# Patient Record
Sex: FEMALE | Race: BLACK OR AFRICAN AMERICAN | NOT HISPANIC OR LATINO | Employment: UNEMPLOYED | URBAN - METROPOLITAN AREA
[De-identification: names, ages, dates, MRNs, and addresses within clinical notes are randomized per-mention and may not be internally consistent; named-entity substitution may affect disease eponyms.]

---

## 2021-05-04 ENCOUNTER — TELEPHONE (OUTPATIENT)
Dept: OBSTETRICS AND GYNECOLOGY | Facility: CLINIC | Age: 36
End: 2021-05-04

## 2021-05-26 ENCOUNTER — OFFICE VISIT (OUTPATIENT)
Dept: OBSTETRICS AND GYNECOLOGY | Facility: CLINIC | Age: 36
End: 2021-05-26

## 2021-05-26 VITALS
DIASTOLIC BLOOD PRESSURE: 70 MMHG | WEIGHT: 177.5 LBS | SYSTOLIC BLOOD PRESSURE: 130 MMHG | HEIGHT: 58 IN | BODY MASS INDEX: 37.26 KG/M2

## 2021-05-26 DIAGNOSIS — R10.2 PELVIC PAIN: Primary | ICD-10-CM

## 2021-05-26 PROCEDURE — 99203 OFFICE O/P NEW LOW 30 MIN: CPT | Performed by: OBSTETRICS & GYNECOLOGY

## 2021-05-26 RX ORDER — PROMETHAZINE HYDROCHLORIDE 25 MG/1
TABLET ORAL
COMMUNITY
Start: 2021-05-19

## 2021-05-26 RX ORDER — TOPIRAMATE 50 MG/1
50 TABLET, FILM COATED ORAL 2 TIMES DAILY
COMMUNITY
Start: 2021-04-23

## 2021-05-26 RX ORDER — TRAZODONE HYDROCHLORIDE 50 MG/1
TABLET ORAL
COMMUNITY
Start: 2021-05-21

## 2021-05-26 RX ORDER — FLUTICASONE PROPIONATE 50 MCG
SPRAY, SUSPENSION (ML) NASAL
COMMUNITY
Start: 2021-02-28

## 2021-05-26 RX ORDER — CLOTRIMAZOLE 1 %
CREAM (GRAM) TOPICAL
COMMUNITY
Start: 2021-04-23

## 2021-05-26 RX ORDER — LURASIDONE HYDROCHLORIDE 120 MG/1
TABLET, FILM COATED ORAL
COMMUNITY
Start: 2021-05-21

## 2021-05-26 RX ORDER — HYDROXYZINE PAMOATE 25 MG/1
25 CAPSULE ORAL
COMMUNITY
Start: 2021-01-07

## 2021-05-26 RX ORDER — BACLOFEN 20 MG/1
20 TABLET ORAL 3 TIMES DAILY
COMMUNITY
Start: 2021-04-23

## 2021-05-26 RX ORDER — FESOTERODINE FUMARATE 8 MG/1
TABLET, FILM COATED, EXTENDED RELEASE ORAL
COMMUNITY
Start: 2021-05-24

## 2021-05-26 RX ORDER — ALBUTEROL SULFATE 90 UG/1
2 AEROSOL, METERED RESPIRATORY (INHALATION)
COMMUNITY
Start: 2021-01-07

## 2021-05-26 RX ORDER — DULOXETIN HYDROCHLORIDE 60 MG/1
60 CAPSULE, DELAYED RELEASE ORAL DAILY
COMMUNITY
Start: 2021-04-23

## 2021-05-26 RX ORDER — CETIRIZINE HYDROCHLORIDE 10 MG/1
TABLET ORAL
COMMUNITY
Start: 2021-05-22

## 2021-05-26 RX ORDER — AMLODIPINE BESYLATE 10 MG/1
TABLET ORAL
COMMUNITY
Start: 2021-05-21

## 2021-05-26 NOTE — PROGRESS NOTES
"        REASON FOR FOLLOWUP/CHIEF COMPLAINT: Pelvic pressure/pain and pregnancy symptoms.     HISTORY OF PRESENT ILLNESS: Patient is seen for evaluation of symptoms that she reports that are similar to when she was pregnant.  She states that she had a last normal menses in February 2021.  She was seen at [facility] specifically to have an ultrasound to confirm a pregnancy that she was convinced was present despite having had negative pregnancy test at her family doctor 2 days prior.  This was on or about 4/20/2021.  Serum pregnancy test were negative on that evaluation and a bedside ultrasound apparently was done on review of the notes that showed no evidence of an intrauterine pregnancy.  The patient saw another provider on or about 4/24 with a similar complaints.  She is been adequately explained by all of these providers that there was no evidence of any pregnancy and with all of the pregnancy test being negative we could not concur that she had had a miscarriage at any point.  She states she still has nausea and pelvic pressure and wishes further evaluation.  Patient also requests STD screening.      Past Medical History, Past Surgical History, Social History, Family History have been reviewed and are without significant changes except as mentioned.    Review of Systems   Review of Systems   Constitutional: Negative for fever.   Genitourinary: Negative for vaginal bleeding.        Pelvic pressure       Medications:  The current medication list was reviewed in the EMR    ALLERGIES:    Allergies   Allergen Reactions   • Penicillins Nausea And Vomiting   • Salicylic Acid GI Intolerance     Pt states she is allergic because it shuts her body down.    • Grapefruit GI Intolerance              Vitals:    05/26/21 1429   BP: 130/70   Weight: 80.5 kg (177 lb 8 oz)   Height: 147.3 cm (58\")     Physical Exam    CONSTITUTIONAL:  Vital signs reviewed.  No distress, looks comfortable.  EYES:  Conjunctiva and " lids unremarkable.  PERRLA  EARS,NOSE,MOUTH,THROAT:  Ears and nose appear unremarkable.  Lips, teeth, gums appear unremarkable.  RESPIRATORY:  Normal respiratory effort.   GASTROINTESTINAL: Abdomen appears unremarkable.  Nontender.  No hepatomegaly.  No splenomegaly.  Well-healed Pfannenstiel incision   PELVIC: Normal external genitalia and cervix no evidence of bleeding or abnormal discharge.  NEURO: cranial nerves 2-12 grossly intact.  No focal deficits.  Appears to have equal strength all 4 extremities.  MUSCULOSKELETAL:  Unremarkable digits/nails.  No cyanosis or clubbing.  SKIN:  Warm.  No rashes.  PSYCHIATRIC:  Normal judgment and insight.  Appears guarded and flat       RECENT LABS:  WBC   Date Value Ref Range Status   10/20/2020 6.21 4.5 - 11.0 10*3/uL Final     Hemoglobin   Date Value Ref Range Status   10/20/2020 15.5 12.0 - 16.0 g/dL Final     Platelets   Date Value Ref Range Status   10/20/2020 346 140 - 440 10*3/uL Final       ASSESSMENT/PLAN:  Kimberly Larios Room/bed info not found   Pelvic pressure with multiple negative pregnancy tests and amenorrhea.  We worked her in for a transvaginal ultrasound to look at the uterus and adnexa.  STD screen performed.  Option of continued expectant management for her next menses versus Provera withdrawal were discussed and offered with the patient.  She elects to wait for spontaneous resumption of her periods.  She wishes to make an appointment for 4 to 6 weeks for an annual visit with Pap smear.    Pelvic ultrasound is done and reviewed with the patient.  It shows a normal-appearing uterus with no free fluid and no adnexal masses.  No cysts are seen.  The endometrium appears a bit thicker.

## 2021-05-29 LAB
A VAGINAE DNA VAG QL NAA+PROBE: NORMAL SCORE
BVAB2 DNA VAG QL NAA+PROBE: NORMAL SCORE
C ALBICANS DNA VAG QL NAA+PROBE: NEGATIVE
C GLABRATA DNA VAG QL NAA+PROBE: NEGATIVE
C TRACH DNA VAG QL NAA+PROBE: NEGATIVE
MEGA1 DNA VAG QL NAA+PROBE: NORMAL SCORE
N GONORRHOEA DNA VAG QL NAA+PROBE: NEGATIVE
T VAGINALIS DNA VAG QL NAA+PROBE: NEGATIVE

## 2021-06-24 ENCOUNTER — OFFICE VISIT (OUTPATIENT)
Dept: OBSTETRICS AND GYNECOLOGY | Facility: CLINIC | Age: 36
End: 2021-06-24

## 2021-06-24 VITALS
BODY MASS INDEX: 37.36 KG/M2 | DIASTOLIC BLOOD PRESSURE: 70 MMHG | HEIGHT: 58 IN | WEIGHT: 178 LBS | SYSTOLIC BLOOD PRESSURE: 128 MMHG

## 2021-06-24 DIAGNOSIS — Z01.419 ENCOUNTER FOR ANNUAL ROUTINE GYNECOLOGICAL EXAMINATION: Primary | ICD-10-CM

## 2021-06-24 PROCEDURE — 99395 PREV VISIT EST AGE 18-39: CPT | Performed by: OBSTETRICS & GYNECOLOGY

## 2021-06-24 NOTE — PROGRESS NOTES
GYN Annual Exam     CC- Here for annual exam.     Kimberly Larios is a 35 y.o. female who presents for annual well woman exam. Periods are Normal.  She just had her first period since and presumed miscarriage about a month to 6 weeks ago.  Lasting 4 days. Dysmenorrhea:none. Cyclic symptoms include none. No intermenstrual bleeding, spotting, or discharge.    OB History        3    Para   2    Term   2            AB   1    Living   2       SAB   1    TAB        Ectopic        Molar        Multiple        Live Births   2                Current contraception: none  History of abnormal Pap smear: no  Family history of uterine, colon or ovarian cancer: no  History of abnormal mammogram: no  Family history of breast cancer: no  Last Pap : More than 2 years ago.  None documented in the epic system.    Past Medical History:   Diagnosis Date   • Anxiety    • Asthma    • Back spasm    • Depression    • HTN (hypertension)    • Migraines        Past Surgical History:   Procedure Laterality Date   •  SECTION           Current Outpatient Medications:   •  albuterol sulfate HFA (ProAir HFA) 108 (90 Base) MCG/ACT inhaler, Inhale 2 puffs., Disp: , Rfl:   •  amLODIPine (NORVASC) 10 MG tablet, , Disp: , Rfl:   •  baclofen (LIORESAL) 20 MG tablet, Take 20 mg by mouth 3 (Three) Times a Day., Disp: , Rfl:   •  cetirizine (zyrTEC) 10 MG tablet, , Disp: , Rfl:   •  clotrimazole (LOTRIMIN) 1 % cream, APPLY TOPICALLY TWICE DAILY TO THE AFFECTED AREA, Disp: , Rfl:   •  DULoxetine (CYMBALTA) 60 MG capsule, Take 60 mg by mouth Daily., Disp: , Rfl:   •  fluticasone (FLONASE) 50 MCG/ACT nasal spray, SPRAY ONCE IN EACH NOSTRIL EVERY DAY, Disp: , Rfl:   •  hydrOXYzine pamoate (VISTARIL) 25 MG capsule, Take 25 mg by mouth., Disp: , Rfl:   •  Latuda 120 MG tablet tablet, , Disp: , Rfl:   •  nicotine polacrilex (NICORETTE) 4 MG gum, Take 4 mg by mouth., Disp: , Rfl:   •  promethazine (PHENERGAN) 25 MG tablet, , Disp: , Rfl:   •   "topiramate (TOPAMAX) 50 MG tablet, Take 50 mg by mouth 2 (Two) Times a Day., Disp: , Rfl:   •  Toviaz 8 MG tablet sustained-release 24 hour tablet, , Disp: , Rfl:   •  traZODone (DESYREL) 50 MG tablet, , Disp: , Rfl:     Allergies   Allergen Reactions   • Penicillins Nausea And Vomiting   • Salicylic Acid GI Intolerance     Pt states she is allergic because it shuts her body down.    • Grapefruit GI Intolerance       Social History     Tobacco Use   • Smoking status: Current Every Day Smoker   • Smokeless tobacco: Never Used   Vaping Use   • Vaping Use: Never used   Substance Use Topics   • Alcohol use: Not Currently   • Drug use: Never       Family History   Problem Relation Age of Onset   • Hypertension Father    • Hypertension Mother    • Hypertension Brother        Review of Systems   Constitutional: Negative for fatigue and fever.   Respiratory: Negative for cough and shortness of breath.    Genitourinary: Negative for menstrual problem, pelvic pain and urinary incontinence.       /70   Ht 147.3 cm (57.99\")   Wt 80.7 kg (178 lb)   LMP 06/02/2021   BMI 37.21 kg/m²     Physical Exam  Genitourinary:      Pelvic exam was performed with patient in the lithotomy position.      No vulval lesion or rash noted.      No urethral tenderness or mass present.      Bladder is not tender.      Bladder neck is mobile.      No lesions in the vagina.      No vaginal erythema or bleeding.      No cervical discharge or lesion.      Uterus is mobile.      Uterus is retroverted and regular.      No right or left adnexal mass present.      Right adnexa not tender.      Left adnexa not tender.   Neck:      Thyroid: No thyroid mass or thyromegaly.   Chest:      Breasts:         Right: No mass, nipple discharge, skin change or tenderness.         Left: No mass, nipple discharge, skin change or tenderness.   Abdominal:      Palpations: There is no mass.      Tenderness: There is no abdominal tenderness.   Neurological:      " Mental Status: She is alert.   Vitals reviewed.               Assessment     1) GYN annual well woman exam.   2) normal menses following recent miscarriage.     Plan     1) Breast Health - Clinical breast exam yearly, Discussed [employer] cancer society recommendations for breast cancer screening, and Self breast awareness monthly  2) Pap -done with high risk HPV.  3) Smoking status-negative  4) Encouraged to be wary of information obtained via social media and internet based on source and search.  5) Follow up prn and one year.       Yandel Ng MD   6/24/2021  14:49 EDT

## 2021-06-29 LAB
CYTOLOGIST CVX/VAG CYTO: ABNORMAL
CYTOLOGY CVX/VAG DOC CYTO: ABNORMAL
CYTOLOGY CVX/VAG DOC THIN PREP: ABNORMAL
DX ICD CODE: ABNORMAL
DX ICD CODE: ABNORMAL
HIV 1 & 2 AB SER-IMP: ABNORMAL
HPV I/H RISK 4 DNA CVX QL PROBE+SIG AMP: POSITIVE
OTHER STN SPEC: ABNORMAL
PATHOLOGIST CVX/VAG CYTO: ABNORMAL
RECOM F/U CVX/VAG CYTO: ABNORMAL
STAT OF ADQ CVX/VAG CYTO-IMP: ABNORMAL

## 2021-06-30 ENCOUNTER — TELEPHONE (OUTPATIENT)
Dept: OBSTETRICS AND GYNECOLOGY | Facility: CLINIC | Age: 36
End: 2021-06-30

## 2021-06-30 NOTE — TELEPHONE ENCOUNTER
Left voicemail for patient to return call to office.    Patient needs to schedule nicole w/ Dr. Ng for abnormal pap.

## 2021-08-30 ENCOUNTER — PROCEDURE VISIT (OUTPATIENT)
Dept: OBSTETRICS AND GYNECOLOGY | Facility: CLINIC | Age: 36
End: 2021-08-30

## 2021-08-30 VITALS
WEIGHT: 182.8 LBS | HEIGHT: 58 IN | SYSTOLIC BLOOD PRESSURE: 120 MMHG | BODY MASS INDEX: 38.37 KG/M2 | DIASTOLIC BLOOD PRESSURE: 72 MMHG

## 2021-08-30 DIAGNOSIS — R87.811 ASCUS WITH POSITIVE HIGH RISK HUMAN PAPILLOMAVIRUS OF VAGINA: Primary | ICD-10-CM

## 2021-08-30 DIAGNOSIS — R87.620 ASCUS WITH POSITIVE HIGH RISK HUMAN PAPILLOMAVIRUS OF VAGINA: Primary | ICD-10-CM

## 2021-08-30 PROCEDURE — 57452 EXAM OF CERVIX W/SCOPE: CPT | Performed by: OBSTETRICS & GYNECOLOGY

## 2021-08-30 RX ORDER — MONTELUKAST SODIUM 10 MG/1
10 TABLET ORAL DAILY
COMMUNITY
Start: 2021-07-14

## 2021-08-30 NOTE — PROGRESS NOTES
Procedure   Procedures       Physical Exam    Colposcopy Procedure Note    Indications: Pap smear 3 months ago showed: ASCUS with POSITIVE high risk HPV. The prior pap showed no abnormalities.  Prior cervical/vaginal disease: normal exam without visible pathology. Prior cervical treatment: no treatment.    Procedure Details   The risks and benefits of the procedure and Written informed consent obtained.    Speculum placed in vagina and excellent visualization of cervix achieved, cervix swabbed x 3 with acetic acid solution.    Findings:  Cervix: no visible lesions, no mosaicism, no punctation and no abnormal vasculature; no biopsies taken.  Vaginal inspection: normal without visible lesions.  Vulvar colposcopy: vulvar colposcopy not performed.    Specimens: None    Complications: none.    Patient tolerated the procedure well without complications.  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  Plan:  Treatment options discussed with patient.  Safe follow-up in about 9 months which would be 1 year from her most recent Pap screen.  Discussed that about 85% of these can resolve on their own over time and no indication exist for surgical treatment at this time.        8/30/2021  Yandel Ng MD

## 2023-11-08 NOTE — TELEPHONE ENCOUNTER
----- Message from Yandel Ng MD sent at 6/30/2021 10:50 AM EDT -----  Notify patient that Pap smear is atypical squamous cell with positive high risk HPV.  Recommendation is for baseline colposcopy.   Patient discharged to home accompanied by family members (caron). AVS reviewed and verbalized understanding. All belongings returned and sent with patient.

## 2023-11-09 ENCOUNTER — TELEPHONE (OUTPATIENT)
Dept: FAMILY MEDICINE CLINIC | Facility: CLINIC | Age: 38
End: 2023-11-09
Payer: MEDICARE

## 2023-11-09 NOTE — TELEPHONE ENCOUNTER
Patient was scheduled to establish care with Dr. Loving tomorrow. The appointment has been canceled per the office and patients decision. Patient canceled last appointment. Patient is requesting medication refills, but is unable to provide what medication she is referring to. Patient has asked for a letter, but the office is unable to provide the letter. Tried to explain to the patient she would have to be seen, establish care with the provider, labs and etc. Before the office can provide anything. She currently is not a patient of this office until she walks though the doors and establishes care with Dr. Loving. I can not say what he will do or not do until she establishes care.

## 2023-11-16 ENCOUNTER — TELEPHONE (OUTPATIENT)
Dept: BEHAVIORAL HEALTH | Facility: CLINIC | Age: 38
End: 2023-11-16
Payer: MEDICARE

## 2023-11-16 NOTE — TELEPHONE ENCOUNTER
Patient called to schedule new patient appointment with KARLO Diaz, per Thao HAMM Will return patient's call at a later time to schedule initial evaluation.

## 2023-11-22 NOTE — TELEPHONE ENCOUNTER
Attempted to return patient's call. Stated I was returning her call to schedule for Doron Cruz. Patient stated she did not wish to be called again before hanging up the phone.

## 2023-12-08 ENCOUNTER — TELEPHONE (OUTPATIENT)
Dept: BEHAVIORAL HEALTH | Facility: CLINIC | Age: 38
End: 2023-12-08
Payer: MEDICARE

## 2023-12-08 NOTE — TELEPHONE ENCOUNTER
Patient called to speak with medical assistant for KARLO Diaz. Patient inquiring about scheduling with a family doctor and behavioral health specialist for anxiety. Patient advised to schedule with one of two nurse practitioners that are accepting new patients, but refused. Patient would only like to see a medical doctor. Patient told none of our medical doctors are accepting new patients. Patient also advised that scheduling for new patients is booked out to May of 2024. Patient stated she cannot wait this long to see a psychiatrist.    Patient given information for [facility] and advised to contact another [facility] office by searching the provider portal to see if any doctors are accepting new patients. Patient verbalized an understanding.